# Patient Record
Sex: FEMALE | Race: WHITE
[De-identification: names, ages, dates, MRNs, and addresses within clinical notes are randomized per-mention and may not be internally consistent; named-entity substitution may affect disease eponyms.]

---

## 2019-02-08 ENCOUNTER — HOSPITAL ENCOUNTER (EMERGENCY)
Dept: HOSPITAL 44 - ED | Age: 29
Discharge: HOME | End: 2019-02-08
Payer: COMMERCIAL

## 2019-02-08 VITALS — DIASTOLIC BLOOD PRESSURE: 87 MMHG | SYSTOLIC BLOOD PRESSURE: 121 MMHG

## 2019-02-08 DIAGNOSIS — R03.0: Primary | ICD-10-CM

## 2019-02-08 PROCEDURE — 99281 EMR DPT VST MAYX REQ PHY/QHP: CPT

## 2019-02-08 NOTE — ED PHYSICIAN DOCUMENTATION
General Adult





- HISTORIAN


Historian: patient





- HPI


Stated Complaint: Elevated BP, throbbing head with new medication


Chief Complaint: General Adult


Onset: hours


Timing: better


Severity: mild


Further Comments: yes (Pt is a 29 yo female from [facility] who was found to 

have a high bp readings there.  Pt was given an NSAID a few hours before the 

reading was taken.  In ER pt was normotensive.  Pt has been at [facility] for 

21 days for alcohol recovery.)





- ROS


CONST: no problems


EYES/ENT: none


CVS/RESP: none


GI/: none


MS/SKIN/LYMPH: none


NEURO/PSYCH: headache (earlier tonight)





- PAST HX


Past History: other (alcohol abuse)


Allergies/Adverse Reactions: 


                                    Allergies











Allergy/AdvReac Type Severity Reaction Status Date / Time


 


red dye Allergy   Verified 02/08/19 00:19














Home Medications: 


                                Ambulatory Orders











 Medication  Instructions  Recorded


 


Lamotrigine [Lamictal] 75 mg PO D 02/08/19


 


Naltrexone [Naltrexone Base 25 mg PO D 02/08/19





Monohydrate]  














- SOCIAL HX


Smoking History: other (e-cigarettes)





- FAMILY HX


Family History: No





- VITAL SIGNS


Vital Signs: 





                                   Vital Signs











Temp Pulse Resp BP Pulse Ox


 


 97.5 F L  95 H  14   121/87   100 


 


 02/08/19 00:05  02/08/19 00:05  02/08/19 00:05  02/08/19 00:05  02/08/19 00:05














- REVIEWED ASSESSMENTS


Nursing Assessment  Reviewed: Yes


Vitals Reviewed: Yes





Progress





- Progress


Progress: 





Pt was normotensive in ER.





d/c to [facility].





General Adult Physical Exam





- PHYSICAL EXAM


GENERAL APPEARANCE: no distress


EENT: eye inspection normal, pharynx normal


NECK: normal inspection, supple


RESPIRATORY: no resp distress, chest non-tender, breath sounds normal


CVS: reg rate & rhythm, heart sounds normal


ABDOMEN: soft, no organomegaly, normal bowel sounds


BACK: normal inspection, no CVA tenderness


SKIN: warm/dry, normal color


EXTREMITIES: non-tender, normal range of motion, no evidence of injury


NEURO: oriented X3, motor nml, sensation nml





Discharge


Clincal Impression: 


 elevated blood pressure reading





Condition: Good


Disposition: 01 HOME, SELF-CARE


Decision to Admit: NO


Decision Time: 00:27